# Patient Record
Sex: FEMALE | URBAN - METROPOLITAN AREA
[De-identification: names, ages, dates, MRNs, and addresses within clinical notes are randomized per-mention and may not be internally consistent; named-entity substitution may affect disease eponyms.]

---

## 2017-01-31 ENCOUNTER — APPOINTMENT (OUTPATIENT)
Age: 52
Setting detail: DERMATOLOGY
End: 2017-02-06

## 2017-01-31 DIAGNOSIS — D18.0 HEMANGIOMA: ICD-10-CM

## 2017-01-31 DIAGNOSIS — L81.4 OTHER MELANIN HYPERPIGMENTATION: ICD-10-CM

## 2017-01-31 DIAGNOSIS — Z80.8 FAMILY HISTORY OF MALIGNANT NEOPLASM OF OTHER ORGANS OR SYSTEMS: ICD-10-CM

## 2017-01-31 DIAGNOSIS — L82.1 OTHER SEBORRHEIC KERATOSIS: ICD-10-CM

## 2017-01-31 DIAGNOSIS — D22 MELANOCYTIC NEVI: ICD-10-CM

## 2017-01-31 PROBLEM — D22.5 MELANOCYTIC NEVI OF TRUNK: Status: ACTIVE | Noted: 2017-01-31

## 2017-01-31 PROBLEM — D18.01 HEMANGIOMA OF SKIN AND SUBCUTANEOUS TISSUE: Status: ACTIVE | Noted: 2017-01-31

## 2017-01-31 PROCEDURE — OTHER MIPS QUALITY: OTHER

## 2017-01-31 PROCEDURE — 99203 OFFICE O/P NEW LOW 30 MIN: CPT

## 2017-01-31 PROCEDURE — OTHER RECOMMENDATIONS: OTHER

## 2017-01-31 PROCEDURE — OTHER COUNSELING: OTHER

## 2017-01-31 ASSESSMENT — LOCATION DETAILED DESCRIPTION DERM
LOCATION DETAILED: RIGHT SUPERIOR UPPER BACK
LOCATION DETAILED: RIGHT INFERIOR CENTRAL MALAR CHEEK
LOCATION DETAILED: LEFT INFERIOR LATERAL MALAR CHEEK
LOCATION DETAILED: LEFT NASOLABIAL FOLD
LOCATION DETAILED: LEFT PROXIMAL POSTERIOR THIGH
LOCATION DETAILED: LEFT INFERIOR MEDIAL UPPER BACK
LOCATION DETAILED: LEFT LATERAL ABDOMEN
LOCATION DETAILED: EPIGASTRIC SKIN

## 2017-01-31 ASSESSMENT — LOCATION SIMPLE DESCRIPTION DERM
LOCATION SIMPLE: RIGHT UPPER BACK
LOCATION SIMPLE: RIGHT CHEEK
LOCATION SIMPLE: LEFT LIP
LOCATION SIMPLE: LEFT UPPER BACK
LOCATION SIMPLE: LEFT CHEEK
LOCATION SIMPLE: ABDOMEN
LOCATION SIMPLE: LEFT POSTERIOR THIGH

## 2017-01-31 ASSESSMENT — LOCATION ZONE DERM
LOCATION ZONE: LEG
LOCATION ZONE: TRUNK
LOCATION ZONE: FACE
LOCATION ZONE: LIP
LOCATION ZONE: TRUNK

## 2017-01-31 NOTE — PROCEDURE: RECOMMENDATIONS
Detail Level: Zone
Recommendations (Free Text): Differin otc
Recommendation Preamble: The following recommendations were made during the visit:

## 2017-01-31 NOTE — PROCEDURE: MIPS QUALITY
Quality 110: Preventive Care And Screening: Influenza Immunization: Influenza Immunization not Administered for Documented Reasons.
Detail Level: Detailed
Quality 226: Preventive Care And Screening: Tobacco Use: Screening And Cessation Intervention: Patient screened for tobacco and never smoked

## 2017-10-31 ENCOUNTER — APPOINTMENT (OUTPATIENT)
Age: 52
Setting detail: DERMATOLOGY
End: 2017-11-13

## 2017-10-31 DIAGNOSIS — L57.0 ACTINIC KERATOSIS: ICD-10-CM

## 2017-10-31 DIAGNOSIS — L82.0 INFLAMED SEBORRHEIC KERATOSIS: ICD-10-CM

## 2017-10-31 PROBLEM — D23.9 OTHER BENIGN NEOPLASM OF SKIN, UNSPECIFIED: Status: ACTIVE | Noted: 2017-10-31

## 2017-10-31 PROCEDURE — 99213 OFFICE O/P EST LOW 20 MIN: CPT | Mod: 25

## 2017-10-31 PROCEDURE — OTHER COUNSELING: OTHER

## 2017-10-31 PROCEDURE — OTHER LIQUID NITROGEN: OTHER

## 2017-10-31 PROCEDURE — 17003 DESTRUCT PREMALG LES 2-14: CPT

## 2017-10-31 PROCEDURE — 17000 DESTRUCT PREMALG LESION: CPT | Mod: 59

## 2017-10-31 PROCEDURE — OTHER MIPS QUALITY: OTHER

## 2017-10-31 PROCEDURE — 17110 DESTRUCT B9 LESION 1-14: CPT

## 2017-10-31 ASSESSMENT — LOCATION SIMPLE DESCRIPTION DERM
LOCATION SIMPLE: CHEST
LOCATION SIMPLE: LEFT FOREHEAD
LOCATION SIMPLE: RIGHT FOREARM

## 2017-10-31 ASSESSMENT — LOCATION DETAILED DESCRIPTION DERM
LOCATION DETAILED: RIGHT PROXIMAL RADIAL DORSAL FOREARM
LOCATION DETAILED: LEFT INFERIOR MEDIAL FOREHEAD
LOCATION DETAILED: MIDDLE STERNUM

## 2017-10-31 ASSESSMENT — LOCATION ZONE DERM
LOCATION ZONE: FACE
LOCATION ZONE: ARM
LOCATION ZONE: TRUNK

## 2017-10-31 NOTE — PROCEDURE: MIPS QUALITY
Quality 226: Preventive Care And Screening: Tobacco Use: Screening And Cessation Intervention: Patient screened for tobacco and never smoked
Detail Level: Detailed
Quality 110: Preventive Care And Screening: Influenza Immunization: Influenza Immunization not Administered for Documented Reasons.

## 2017-10-31 NOTE — PROCEDURE: LIQUID NITROGEN
Number Of Freeze-Thaw Cycles: 3 freeze-thaw cycles
Detail Level: Zone
Medical Necessity Information: It is in your best interest to select a reason for this procedure from the list below. All of these items fulfill various CMS LCD requirements except the new and changing color options.
Render Post-Care Instructions In Note?: yes
Number Of Freeze-Thaw Cycles: 4 freeze-thaw cycles
Total Number Of Aks Treated: 2
Detail Level: Generalized
Consent: Verbal consent
Add 52 Modifier (Optional): no
Pared With?: 15 blade

## 2019-03-08 ENCOUNTER — APPOINTMENT (OUTPATIENT)
Dept: CT IMAGING | Age: 54
End: 2019-03-08
Attending: EMERGENCY MEDICINE
Payer: COMMERCIAL

## 2019-03-08 ENCOUNTER — HOSPITAL ENCOUNTER (EMERGENCY)
Age: 54
Discharge: HOME | End: 2019-03-08
Attending: EMERGENCY MEDICINE
Payer: COMMERCIAL

## 2019-03-08 VITALS
RESPIRATION RATE: 14 BRPM | OXYGEN SATURATION: 100 % | TEMPERATURE: 97.8 F | WEIGHT: 173.5 LBS | DIASTOLIC BLOOD PRESSURE: 93 MMHG | SYSTOLIC BLOOD PRESSURE: 162 MMHG | HEART RATE: 83 BPM

## 2019-03-08 DIAGNOSIS — R51.9 SINUS HEADACHE: Primary | ICD-10-CM

## 2019-03-08 PROCEDURE — 99284 EMERGENCY DEPT VISIT MOD MDM: CPT

## 2019-03-08 PROCEDURE — 74011250637 HC RX REV CODE- 250/637: Performed by: EMERGENCY MEDICINE

## 2019-03-08 PROCEDURE — 70450 CT HEAD/BRAIN W/O DYE: CPT

## 2019-03-08 RX ORDER — ONDANSETRON 4 MG/1
4 TABLET, ORALLY DISINTEGRATING ORAL
Status: COMPLETED | OUTPATIENT
Start: 2019-03-08 | End: 2019-03-08

## 2019-03-08 RX ORDER — FLUTICASONE PROPIONATE 50 MCG
2 SPRAY, SUSPENSION (ML) NASAL DAILY
Qty: 1 BOTTLE | Refills: 0 | Status: SHIPPED | OUTPATIENT
Start: 2019-03-08

## 2019-03-08 RX ORDER — BUTALBITAL, ACETAMINOPHEN AND CAFFEINE 50; 325; 40 MG/1; MG/1; MG/1
2 TABLET ORAL
Status: COMPLETED | OUTPATIENT
Start: 2019-03-08 | End: 2019-03-08

## 2019-03-08 RX ORDER — DOXYCYCLINE 100 MG/1
100 CAPSULE ORAL 2 TIMES DAILY
COMMUNITY

## 2019-03-08 RX ORDER — LEVOTHYROXINE AND LIOTHYRONINE 19; 4.5 UG/1; UG/1
30 TABLET ORAL DAILY
COMMUNITY

## 2019-03-08 RX ORDER — ONDANSETRON 4 MG/1
4 TABLET, ORALLY DISINTEGRATING ORAL
Qty: 30 TAB | Refills: 0 | Status: SHIPPED | OUTPATIENT
Start: 2019-03-08

## 2019-03-08 RX ORDER — BUTALBITAL, ACETAMINOPHEN AND CAFFEINE 50; 325; 40 MG/1; MG/1; MG/1
TABLET ORAL
Qty: 30 TAB | Refills: 0 | Status: SHIPPED | OUTPATIENT
Start: 2019-03-08

## 2019-03-08 RX ADMIN — BUTALBITAL, ACETAMINOPHEN AND CAFFEINE 2 TABLET: 50; 325; 40 TABLET ORAL at 09:37

## 2019-03-08 RX ADMIN — ONDANSETRON 4 MG: 4 TABLET, ORALLY DISINTEGRATING ORAL at 09:36

## 2019-03-08 NOTE — LETTER
[facility] EMERGENCY DEPT 
[address] [place] [address] Tahmina Perera [id number] 
[phone] Work/School Note Date: 3/8/2019 To Whom It May concern: Farzaneh Palacios Sunday was seen and treated today in the emergency room by the following provider(s): 
Attending Provider: Richard Saucedo MD. Farzaneh Philip Sunday seen at treated in the ED this morning d/c at 11am 
 
Sincerely, 
 
 
 
 
Adriane Hill MD

## 2019-03-08 NOTE — ED PROVIDER NOTES
The history is provided by the patient and the spouse. Headache    This is a new problem. Episode onset: this morning about 5am. The problem occurs constantly. The problem has not changed since onset. Associated with: started ABX for sinus infection 2 days ago. Pain location: comes from back to front over top of her head. The quality of the pain is described as throbbing. The pain is at a severity of 7/10. Associated symptoms include nausea. Pertinent negatives include no fever, no palpitations, no syncope, no shortness of breath, no weakness, no dizziness and no visual change. She has tried darkened room for the symptoms. Past Medical History:   Diagnosis Date    Endocrine disease     hypothyroid    Ill-defined condition     PFO        Past Surgical History:   Procedure Laterality Date    HX GYN      c section    HX HEENT      tonsills         History reviewed. No pertinent family history. Social History     Socioeconomic History    Marital status: Not on file     Spouse name: Not on file    Number of children: Not on file    Years of education: Not on file    Highest education level: Not on file   Social Needs    Financial resource strain: Not on file    Food insecurity - worry: Not on file    Food insecurity - inability: Not on file    Transportation needs - medical: Not on file   [employer] needs - non-medical: Not on file   Occupational History    Not on file   Tobacco Use    Smoking status: Never Smoker   Substance and Sexual Activity    Alcohol use: Yes     Comment: occassional    Drug use: No    Sexual activity: Not on file   Other Topics Concern    Not on file   Social History Narrative    Not on file         ALLERGIES: Codeine and Penicillins    Review of Systems   Constitutional: Negative for chills and fever. HENT: Positive for sinus pressure and sinus pain. Eyes: Negative for photophobia and visual disturbance. Respiratory: Negative.   Negative for shortness of breath. Cardiovascular: Negative. Negative for palpitations and syncope. Gastrointestinal: Positive for nausea. Negative for abdominal pain and diarrhea. Genitourinary: Negative. Musculoskeletal: Negative. Neurological: Positive for headaches. Negative for dizziness, weakness, light-headedness and numbness. Vitals:    03/08/19 0911   BP: (!) 156/93   Pulse: 83   Resp: 14   Temp: 97.8 °F (36.6 °C)   SpO2: 99%   Weight: 78.7 kg (173 lb 8 oz)            Physical Exam   Constitutional: She is oriented to person, place, and time. She appears well-developed and well-nourished. Appears uncomfortable, but not toxic   HENT:   Head: Normocephalic and atraumatic. Nose: Right sinus exhibits maxillary sinus tenderness. Left sinus exhibits maxillary sinus tenderness. Left sinus exhibits no frontal sinus tenderness. Mouth/Throat: Oropharynx is clear and moist.   Eyes: Conjunctivae and EOM are normal. Pupils are equal, round, and reactive to light. Neck: Normal range of motion. Neck supple. Cardiovascular: Normal rate, regular rhythm, normal heart sounds and intact distal pulses. No murmur heard. Pulmonary/Chest: Effort normal and breath sounds normal. No stridor. No respiratory distress. Abdominal: Soft. Bowel sounds are normal. There is no tenderness. Musculoskeletal: Normal range of motion. She exhibits no edema or tenderness. Neurological: She is alert and oriented to person, place, and time. Coordination normal.   Skin: Skin is warm and dry. She is not diaphoretic. Psychiatric: She has a normal mood and affect. Nursing note and vitals reviewed. MDM  Number of Diagnoses or Management Options  Diagnosis management comments: Patient being treated for sinus infection, c/o increased sinus pressure and pressure headache. Will give meds for symptoms.   Patient asked about whether she could fly later today, I told her she may feel bad and the sinus pressure/pain may be worse while on the plane. I advised her that I believe she was still safe to fly and that she could still get on her flight. 1000 - patient vomited once, all liquid, no pill fragments noted by staff. Head Ct ordered - to exclude ICB as patient's bp is up       Amount and/or Complexity of Data Reviewed  Tests in the radiology section of CPT®: ordered and reviewed           Procedures           VITALS:   Patient Vitals for the past 8 hrs:   Temp Pulse Resp BP SpO2   03/08/19 1030    (!) 162/93 100 %   03/08/19 1000    (!) 163/91 99 %   03/08/19 0945    (!) 190/92 97 %   03/08/19 0930    (!) 175/99 100 %   03/08/19 0915    (!) 170/95 100 %   03/08/19 0911 97.8 °F (36.6 °C) 83 14 (!) 156/93 99 %                  No results found for this or any previous visit (from the past 24 hour(s)). Ct Head Wo Cont    Result Date: 3/8/2019  INDICATION: acute severe headache. Sinus infection, diagnosed Wednesday. Exam: Noncontrast CT of the brain is performed with 5 mm collimation. CT dose reduction was achieved with the use of the standardized protocol tailored for this examination and automatic exposure control for dose modulation. FINDINGS: There is no acute intracranial hemorrhage, mass, mass effect or herniation. Ventricular system is normal. The gray-white matter differentiation is well-preserved. The mastoid air cells are well pneumatized. The visualized paranasal sinuses are clear. IMPRESSION: No acute intracranial hemorrhage, mass or infarct.       1045 - no EMC at this time, d/c home with meds for symptoms

## 2019-03-08 NOTE — ED NOTES
Pt placed call lainez on, Chava Og  [employer] responded report of pt has emesis x1 no pill fragments seen in greenish clear fluid. Dr Kirstin Mae made aware.

## 2019-03-08 NOTE — ED NOTES
Purposeful rounding done. Pt sitting up on stretcher. Offered assist with any needs. Pt states \"no further nausea at this time. \" Call bell in reach will continue to monitor.

## 2019-03-08 NOTE — ED TRIAGE NOTES
Pt ambulated to the treatment area with a steady gait. Pt states \"I was seen by my doctor Wednesday for a sinus infection I was put on Doxycycline, benadryl and I took 1 spray of Afrin last night in each nostril and this morning at 5am I woke up with a severe headache. \"

## 2019-03-08 NOTE — DISCHARGE INSTRUCTIONS
Patient Education        Head or Face Pain: Care Instructions  Your Care Instructions    Common causes of head or face pain are allergies, stress, and injuries. Other causes include tooth problems and sinus infections. Eating certain foods, such as chocolate or cheese, or drinking certain liquids, such as coffee or cola, can cause head pain for some people. If you have mild head pain, you may not need treatment. It is important to watch your symptoms and talk to your doctor if your pain continues or gets worse. Follow-up care is a key part of your treatment and safety. Be sure to make and go to all appointments, and call your doctor if you are having problems. It's also a good idea to know your test results and keep a list of the medicines you take. How can you care for yourself at home? · Take pain medicines exactly as directed. ? If the doctor gave you a prescription medicine for pain, take it as prescribed. ? If you are not taking a prescription pain medicine, ask your doctor if you can take an over-the-counter pain medicine. · Take it easy for the next few days or longer if you are not feeling well. · Use a warm, moist towel or heating pad set on low to relax tight muscles in your shoulder and neck. Have someone gently massage your neck and shoulders. · Put ice or a cold pack on the area for 10 to 20 minutes at a time. Put a thin cloth between the ice and your skin. When should you call for help? Call 911 anytime you think you may need emergency care. For example, call if:    · You have twitching, jerking, or a seizure.     · You passed out (lost consciousness).     · You have symptoms of a stroke. These may include:  ? Sudden numbness, tingling, weakness, or loss of movement in your face, arm, or leg, especially on only one side of your body. ? Sudden vision changes. ? Sudden trouble speaking. ? Sudden confusion or trouble understanding simple statements.   ? Sudden problems with walking or balance. ? A sudden, severe headache that is different from past headaches.     · You have jaw pain and pain in your chest, shoulder, neck, or arm.    Call your doctor now or seek immediate medical care if:    · You have a fever with a stiff neck or a severe headache.     · You have nausea and vomiting, or you cannot keep food or liquids down.    Watch closely for changes in your health, and be sure to contact your doctor if:    · Your head or face pain does not get better as expected. Where can you learn more? Go to http://melanie-lakshmi.info/. Enter P568 in the search box to learn more about \"Head or Face Pain: Care Instructions. \"  Current as of: September 23, 2018  Content Version: 11.9  © 7765-7760 [employer]. Care instructions adapted under license by [employer] (which disclaims liability or warranty for this information). If you have questions about a medical condition or this instruction, always ask your healthcare professional. [place] [address] any warranty or liability for your use of this information.